# Patient Record
(demographics unavailable — no encounter records)

---

## 2019-08-31 NOTE — NUR
PT RECEIVED WEATHER APPROPRIATE CLOTHING FROM [employer], UNABLE TO GET A HOLD OF 
PT EX WIFE OR DAUGHTER, CHARGE NURSE EUSEBIA CALLED HOUSE SUPERVISER FOR TAXI 
VOUCHER

## 2019-08-31 NOTE — NUR
Patient discharged with v/s stable. Written and verbal after care instructions 
given and explained. 

Patient verbalized understanding. Ambulatory with steady gait. All questions 
addressed prior to discharge. Advised to follow up with PMD. PT WAITING FOR 
CLOTHING FROM [employer] AND THEN GOING TO WAIT IN LOBBY FOR CALL BACK FROM EX 
WIFE.


-------------------------------------------------------------------------------

Addendum: 08/31/19 at 2227 by CLAUDETTE

-------------------------------------------------------------------------------

PT STATES HE DOES NOT WANT A BUS VOUCHER

## 2019-08-31 NOTE — NUR
PT IS NOT ABLE TO RECALL FAMILY PHONE NUMBERS AND DOES NOT HAVE HIS CELL PHONE. 
PT WILL LET NURSE KNOW IF HE REMEMBERS INFORMATION. ER MD MADE AWARE OF STATUS.

## 2019-08-31 NOTE — NUR
PT IS ALERT AND ORENTED X 4. PT STATED "OK TO BE D/C". WILL TRY TO CONTACT 
FAMILY FOR TRANSPORT BACK HOME.

## 2019-08-31 NOTE — NUR
CALLED PT EX WIFE TO SEE IF SHE COULD GIVE PT HIS DAUGHTER'S PHONE INFORMATION. 
NO ANSWER, LEFT ROSARIO. [phone]